# Patient Record
Sex: FEMALE
[De-identification: names, ages, dates, MRNs, and addresses within clinical notes are randomized per-mention and may not be internally consistent; named-entity substitution may affect disease eponyms.]

---

## 2024-03-09 PROBLEM — Z00.00 ENCOUNTER FOR PREVENTIVE HEALTH EXAMINATION: Status: ACTIVE | Noted: 2024-03-09

## 2024-03-14 ENCOUNTER — APPOINTMENT (OUTPATIENT)
Dept: ORTHOPEDIC SURGERY | Facility: CLINIC | Age: 64
End: 2024-03-14

## 2024-03-16 ENCOUNTER — APPOINTMENT (OUTPATIENT)
Dept: ORTHOPEDIC SURGERY | Facility: CLINIC | Age: 64
End: 2024-03-16
Payer: COMMERCIAL

## 2024-03-16 VITALS — WEIGHT: 180 LBS | BODY MASS INDEX: 33.13 KG/M2 | HEIGHT: 62 IN

## 2024-03-16 DIAGNOSIS — Z78.9 OTHER SPECIFIED HEALTH STATUS: ICD-10-CM

## 2024-03-16 PROCEDURE — 99203 OFFICE O/P NEW LOW 30 MIN: CPT | Mod: 25

## 2024-03-16 PROCEDURE — 20612 ASPIRATE/INJ GANGLION CYST: CPT | Mod: LT

## 2024-03-16 PROCEDURE — 73110 X-RAY EXAM OF WRIST: CPT | Mod: LT

## 2024-03-16 NOTE — IMAGING
[de-identified] : left hand with radial mass 1x1 cm, soft, mobile, nttp rom is full and pain free Finkelstein, TFCC Grind and Molina Tests are negative strength is 4/4 all digits are nvi.    Left hand 3 view xray shows no acute bony pathology.

## 2024-03-16 NOTE — HISTORY OF PRESENT ILLNESS
[Dull/Aching] : dull/aching [0] : 0 [Localized] : localized [Intermittent] : intermittent [Full time] : Work status: full time [de-identified] : 3/16/24: rhd 64 y/o patient complaining of a big bump in her left wrist that she noticed 2 years ago no injury. states it has gotten bigger, had similar issue many years ago.  PMH: denied Allergies: nkda [FreeTextEntry1] : left hand/wrist [] : Post Surgical Visit: no [de-identified] : activity

## 2024-03-16 NOTE — ASSESSMENT
[FreeTextEntry1] : The patient was advised of the diagnosis. The natural history of the pathology was explained in full to the patient in layman's terms. All questions were answered. The risks and benefits of surgical and non-surgical treatment alternatives were explained in full to the patient.  We reviewed the pathology and treatment options- patient aware that options include observation, aspiration, surgery- aspiration with 50/50 chance of resolution, surgery usually 95-97% effective. Today the patient is amenable to aspiration,  Understands that with volar masses chance of arterial injury which may result in ecchymosis and occasional hematoma.  After sterile prep, aspirated jelly like substance consistent with ganglion 3 cc and Celestone 1 cc provided.  rto in 4-6 weeks.

## 2024-05-08 ENCOUNTER — APPOINTMENT (OUTPATIENT)
Dept: ORTHOPEDIC SURGERY | Facility: CLINIC | Age: 64
End: 2024-05-08
Payer: COMMERCIAL

## 2024-05-08 VITALS — WEIGHT: 180 LBS | HEIGHT: 62 IN | BODY MASS INDEX: 33.13 KG/M2

## 2024-05-08 DIAGNOSIS — M67.432 GANGLION, LEFT WRIST: ICD-10-CM

## 2024-05-08 PROCEDURE — 99214 OFFICE O/P EST MOD 30 MIN: CPT | Mod: 25

## 2024-05-08 PROCEDURE — 20612 ASPIRATE/INJ GANGLION CYST: CPT | Mod: LT

## 2024-05-08 NOTE — IMAGING
[de-identified] : left hand with radial mass 1x1 cm, soft, mobile, nttp rom is full and pain free Finkelstein, TFCC Grind and Molina Tests are negative strength is 4/4 all digits are nvi.    previous Left hand 3 view xray shows no acute bony pathology.

## 2024-05-08 NOTE — ASSESSMENT
[FreeTextEntry1] : The patient was advised of the diagnosis. The natural history of the pathology was explained in full to the patient in layman's terms. All questions were answered. The risks and benefits of surgical and non-surgical treatment alternatives were explained in full to the patient.  We reviewed the pathology and treatment options- patient aware that options include observation, aspiration, surgery- aspiration with 50/50 chance of resolution, surgery usually 95-97% effective. Today the patient is amenable to aspiration,  Understands that with volar masses chance of arterial injury which may result in ecchymosis and occasional hematoma.  After sterile prep, aspirated jelly like substance consistent with ganglion cyst (3 cc aspirated)  rto prn

## 2024-05-08 NOTE — HISTORY OF PRESENT ILLNESS
[0] : 0 [Dull/Aching] : dull/aching [Localized] : localized [Intermittent] : intermittent [Full time] : Work status: full time [de-identified] : 5/8/2024: pt here for f/u of left wrist radial ganglion cyst which has returned s/p aspiration 3/16/2024.  3/16/24: rhd 64 y/o patient complaining of a big bump in her left wrist that she noticed 2 years ago no injury. states it has gotten bigger, had similar issue many years ago.  PMH: denied Allergies: nkda [] : Post Surgical Visit: no [FreeTextEntry1] : left hand/wrist [de-identified] : activity

## 2025-05-08 ENCOUNTER — TRANSCRIPTION ENCOUNTER (OUTPATIENT)
Age: 65
End: 2025-05-08